# Patient Record
Sex: MALE | Race: WHITE | NOT HISPANIC OR LATINO | Employment: FULL TIME | URBAN - METROPOLITAN AREA
[De-identification: names, ages, dates, MRNs, and addresses within clinical notes are randomized per-mention and may not be internally consistent; named-entity substitution may affect disease eponyms.]

---

## 2018-11-17 ENCOUNTER — ANESTHESIA EVENT (EMERGENCY)
Dept: PERIOP | Facility: HOSPITAL | Age: 33
End: 2018-11-17
Payer: COMMERCIAL

## 2018-11-17 ENCOUNTER — HOSPITAL ENCOUNTER (OUTPATIENT)
Facility: HOSPITAL | Age: 33
Setting detail: OBSERVATION
Discharge: HOME | End: 2018-11-18
Attending: EMERGENCY MEDICINE | Admitting: INTERNAL MEDICINE
Payer: COMMERCIAL

## 2018-11-17 ENCOUNTER — ANESTHESIA (EMERGENCY)
Dept: PERIOP | Facility: HOSPITAL | Age: 33
End: 2018-11-17
Payer: COMMERCIAL

## 2018-11-17 DIAGNOSIS — R58 BLEEDING: ICD-10-CM

## 2018-11-17 DIAGNOSIS — Z48.814: Primary | ICD-10-CM

## 2018-11-17 LAB
ANION GAP SERPL CALCULATED.3IONS-SCNC: 11 MMOL/L (ref 4–13)
BASOPHILS # BLD AUTO: 0.1 THOUSANDS/ΜL (ref 0–0.1)
BASOPHILS NFR BLD AUTO: 1 % (ref 0–1)
BUN SERPL-MCNC: 21 MG/DL (ref 5–25)
CALCIUM SERPL-MCNC: 8.9 MG/DL (ref 8.3–10.1)
CHLORIDE SERPL-SCNC: 99 MMOL/L (ref 100–108)
CO2 SERPL-SCNC: 27 MMOL/L (ref 21–32)
CREAT SERPL-MCNC: 0.97 MG/DL (ref 0.6–1.3)
EOSINOPHIL # BLD AUTO: 0.31 THOUSAND/ΜL (ref 0–0.61)
EOSINOPHIL NFR BLD AUTO: 3 % (ref 0–6)
ERYTHROCYTE [DISTWIDTH] IN BLOOD BY AUTOMATED COUNT: 12.5 % (ref 11.6–15.1)
GFR SERPL CREATININE-BSD FRML MDRD: 103 ML/MIN/1.73SQ M
GLUCOSE SERPL-MCNC: 101 MG/DL (ref 65–140)
HCT VFR BLD AUTO: 41.6 % (ref 36.5–49.3)
HGB BLD-MCNC: 13.6 G/DL (ref 12–17)
IMM GRANULOCYTES # BLD AUTO: 0.05 THOUSAND/UL (ref 0–0.2)
IMM GRANULOCYTES NFR BLD AUTO: 0 % (ref 0–2)
LYMPHOCYTES # BLD AUTO: 2.84 THOUSANDS/ΜL (ref 0.6–4.47)
LYMPHOCYTES NFR BLD AUTO: 23 % (ref 14–44)
MCH RBC QN AUTO: 29.6 PG (ref 26.8–34.3)
MCHC RBC AUTO-ENTMCNC: 32.7 G/DL (ref 31.4–37.4)
MCV RBC AUTO: 90 FL (ref 82–98)
MONOCYTES # BLD AUTO: 1.03 THOUSAND/ΜL (ref 0.17–1.22)
MONOCYTES NFR BLD AUTO: 8 % (ref 4–12)
NEUTROPHILS # BLD AUTO: 8.03 THOUSANDS/ΜL (ref 1.85–7.62)
NEUTS SEG NFR BLD AUTO: 65 % (ref 43–75)
NRBC BLD AUTO-RTO: 0 /100 WBCS
PLATELET # BLD AUTO: 225 THOUSANDS/UL (ref 149–390)
PMV BLD AUTO: 10.6 FL (ref 8.9–12.7)
POTASSIUM SERPL-SCNC: 3.8 MMOL/L (ref 3.5–5.3)
RBC # BLD AUTO: 4.6 MILLION/UL (ref 3.88–5.62)
SODIUM SERPL-SCNC: 137 MMOL/L (ref 136–145)
WBC # BLD AUTO: 12.36 THOUSAND/UL (ref 4.31–10.16)

## 2018-11-17 PROCEDURE — 99284 EMERGENCY DEPT VISIT MOD MDM: CPT

## 2018-11-17 PROCEDURE — 80048 BASIC METABOLIC PNL TOTAL CA: CPT | Performed by: PHYSICIAN ASSISTANT

## 2018-11-17 PROCEDURE — 96361 HYDRATE IV INFUSION ADD-ON: CPT

## 2018-11-17 PROCEDURE — 36415 COLL VENOUS BLD VENIPUNCTURE: CPT | Performed by: PHYSICIAN ASSISTANT

## 2018-11-17 PROCEDURE — 85025 COMPLETE CBC W/AUTO DIFF WBC: CPT | Performed by: PHYSICIAN ASSISTANT

## 2018-11-17 PROCEDURE — 96376 TX/PRO/DX INJ SAME DRUG ADON: CPT

## 2018-11-17 PROCEDURE — 90686 IIV4 VACC NO PRSV 0.5 ML IM: CPT | Performed by: INTERNAL MEDICINE

## 2018-11-17 PROCEDURE — 99219 PR INITIAL OBSERVATION CARE/DAY 50 MINUTES: CPT | Performed by: INTERNAL MEDICINE

## 2018-11-17 PROCEDURE — 96374 THER/PROPH/DIAG INJ IV PUSH: CPT

## 2018-11-17 RX ORDER — MORPHINE SULFATE 4 MG/ML
4 INJECTION, SOLUTION INTRAMUSCULAR; INTRAVENOUS ONCE
Status: COMPLETED | OUTPATIENT
Start: 2018-11-17 | End: 2018-11-17

## 2018-11-17 RX ORDER — CLINDAMYCIN PHOSPHATE 150 MG/ML
INJECTION, SOLUTION INTRAVENOUS AS NEEDED
Status: DISCONTINUED | OUTPATIENT
Start: 2018-11-17 | End: 2018-11-17 | Stop reason: SURG

## 2018-11-17 RX ORDER — SUCCINYLCHOLINE/SOD CL,ISO/PF 100 MG/5ML
SYRINGE (ML) INTRAVENOUS AS NEEDED
Status: DISCONTINUED | OUTPATIENT
Start: 2018-11-17 | End: 2018-11-17 | Stop reason: SURG

## 2018-11-17 RX ORDER — LIDOCAINE HYDROCHLORIDE AND EPINEPHRINE 10; 10 MG/ML; UG/ML
20 INJECTION, SOLUTION INFILTRATION; PERINEURAL ONCE
Status: COMPLETED | OUTPATIENT
Start: 2018-11-17 | End: 2018-11-17

## 2018-11-17 RX ORDER — OXYCODONE HYDROCHLORIDE AND ACETAMINOPHEN 5; 325 MG/1; MG/1
1 TABLET ORAL EVERY 4 HOURS PRN
Status: DISCONTINUED | OUTPATIENT
Start: 2018-11-17 | End: 2018-11-18 | Stop reason: HOSPADM

## 2018-11-17 RX ORDER — FENTANYL CITRATE 50 UG/ML
INJECTION, SOLUTION INTRAMUSCULAR; INTRAVENOUS AS NEEDED
Status: DISCONTINUED | OUTPATIENT
Start: 2018-11-17 | End: 2018-11-17 | Stop reason: SURG

## 2018-11-17 RX ORDER — PROPOFOL 10 MG/ML
INJECTION, EMULSION INTRAVENOUS AS NEEDED
Status: DISCONTINUED | OUTPATIENT
Start: 2018-11-17 | End: 2018-11-17 | Stop reason: SURG

## 2018-11-17 RX ORDER — FENTANYL CITRATE/PF 50 MCG/ML
50 SYRINGE (ML) INJECTION
Status: COMPLETED | OUTPATIENT
Start: 2018-11-17 | End: 2018-11-17

## 2018-11-17 RX ORDER — TRANEXAMIC ACID 100 MG/ML
500 INJECTION, SOLUTION INTRAVENOUS ONCE
Status: DISCONTINUED | OUTPATIENT
Start: 2018-11-17 | End: 2018-11-18 | Stop reason: HOSPADM

## 2018-11-17 RX ORDER — SODIUM CHLORIDE AND POTASSIUM CHLORIDE .9; .15 G/100ML; G/100ML
125 SOLUTION INTRAVENOUS CONTINUOUS
Status: DISCONTINUED | OUTPATIENT
Start: 2018-11-17 | End: 2018-11-18 | Stop reason: HOSPADM

## 2018-11-17 RX ORDER — DEXAMETHASONE SODIUM PHOSPHATE 4 MG/ML
INJECTION, SOLUTION INTRA-ARTICULAR; INTRALESIONAL; INTRAMUSCULAR; INTRAVENOUS; SOFT TISSUE AS NEEDED
Status: DISCONTINUED | OUTPATIENT
Start: 2018-11-17 | End: 2018-11-17 | Stop reason: SURG

## 2018-11-17 RX ORDER — ONDANSETRON 2 MG/ML
INJECTION INTRAMUSCULAR; INTRAVENOUS AS NEEDED
Status: DISCONTINUED | OUTPATIENT
Start: 2018-11-17 | End: 2018-11-17 | Stop reason: SURG

## 2018-11-17 RX ORDER — SODIUM CHLORIDE 9 MG/ML
INJECTION, SOLUTION INTRAVENOUS CONTINUOUS PRN
Status: DISCONTINUED | OUTPATIENT
Start: 2018-11-17 | End: 2018-11-17 | Stop reason: SURG

## 2018-11-17 RX ORDER — SODIUM CHLORIDE 9 MG/ML
125 INJECTION, SOLUTION INTRAVENOUS CONTINUOUS
Status: CANCELLED | OUTPATIENT
Start: 2018-11-17

## 2018-11-17 RX ORDER — ONDANSETRON 2 MG/ML
4 INJECTION INTRAMUSCULAR; INTRAVENOUS ONCE AS NEEDED
Status: DISCONTINUED | OUTPATIENT
Start: 2018-11-17 | End: 2018-11-17 | Stop reason: HOSPADM

## 2018-11-17 RX ORDER — LIDOCAINE HYDROCHLORIDE 10 MG/ML
INJECTION, SOLUTION INFILTRATION; PERINEURAL AS NEEDED
Status: DISCONTINUED | OUTPATIENT
Start: 2018-11-17 | End: 2018-11-17 | Stop reason: SURG

## 2018-11-17 RX ADMIN — SODIUM CHLORIDE AND POTASSIUM CHLORIDE 125 ML/HR: .9; .15 SOLUTION INTRAVENOUS at 21:29

## 2018-11-17 RX ADMIN — FENTANYL CITRATE 50 MCG: 50 INJECTION, SOLUTION INTRAMUSCULAR; INTRAVENOUS at 19:05

## 2018-11-17 RX ADMIN — LIDOCAINE HYDROCHLORIDE 50 MG: 10 INJECTION, SOLUTION INFILTRATION; PERINEURAL at 17:36

## 2018-11-17 RX ADMIN — SODIUM CHLORIDE: 0.9 INJECTION, SOLUTION INTRAVENOUS at 17:31

## 2018-11-17 RX ADMIN — CLINDAMYCIN PHOSPHATE 600 MG: 150 INJECTION, SOLUTION INTRAMUSCULAR; INTRAVENOUS at 17:44

## 2018-11-17 RX ADMIN — FENTANYL CITRATE 50 MCG: 50 INJECTION, SOLUTION INTRAMUSCULAR; INTRAVENOUS at 19:16

## 2018-11-17 RX ADMIN — FENTANYL CITRATE 50 MCG: 50 INJECTION, SOLUTION INTRAMUSCULAR; INTRAVENOUS at 18:54

## 2018-11-17 RX ADMIN — FENTANYL CITRATE 50 MCG: 50 INJECTION, SOLUTION INTRAMUSCULAR; INTRAVENOUS at 19:35

## 2018-11-17 RX ADMIN — MORPHINE SULFATE 4 MG: 4 INJECTION INTRAVENOUS at 13:10

## 2018-11-17 RX ADMIN — ONDANSETRON HYDROCHLORIDE 4 MG: 2 INJECTION, SOLUTION INTRAVENOUS at 17:41

## 2018-11-17 RX ADMIN — OXYCODONE HYDROCHLORIDE AND ACETAMINOPHEN 1 TABLET: 5; 325 TABLET ORAL at 23:37

## 2018-11-17 RX ADMIN — FENTANYL CITRATE 25 MCG: 50 INJECTION, SOLUTION INTRAMUSCULAR; INTRAVENOUS at 17:57

## 2018-11-17 RX ADMIN — MORPHINE SULFATE 4 MG: 4 INJECTION INTRAVENOUS at 15:21

## 2018-11-17 RX ADMIN — DEXAMETHASONE SODIUM PHOSPHATE 4 MG: 4 INJECTION, SOLUTION INTRAMUSCULAR; INTRAVENOUS at 17:41

## 2018-11-17 RX ADMIN — LIDOCAINE HYDROCHLORIDE,EPINEPHRINE BITARTRATE 20 ML: 10; .01 INJECTION, SOLUTION INFILTRATION; PERINEURAL at 13:09

## 2018-11-17 RX ADMIN — PROPOFOL 300 MG: 10 INJECTION, EMULSION INTRAVENOUS at 17:36

## 2018-11-17 RX ADMIN — FENTANYL CITRATE 25 MCG: 50 INJECTION, SOLUTION INTRAMUSCULAR; INTRAVENOUS at 18:15

## 2018-11-17 RX ADMIN — SODIUM CHLORIDE 1000 ML: 0.9 INJECTION, SOLUTION INTRAVENOUS at 13:09

## 2018-11-17 RX ADMIN — Medication 160 MG: at 17:36

## 2018-11-17 RX ADMIN — SODIUM CHLORIDE: 0.9 INJECTION, SOLUTION INTRAVENOUS at 18:23

## 2018-11-17 RX ADMIN — FENTANYL CITRATE 25 MCG: 50 INJECTION, SOLUTION INTRAMUSCULAR; INTRAVENOUS at 18:04

## 2018-11-17 RX ADMIN — FENTANYL CITRATE 25 MCG: 50 INJECTION, SOLUTION INTRAMUSCULAR; INTRAVENOUS at 18:22

## 2018-11-17 RX ADMIN — INFLUENZA VIRUS VACCINE 0.5 ML: 15; 15; 15; 15 SUSPENSION INTRAMUSCULAR at 21:29

## 2018-11-17 NOTE — ED ATTENDING ATTESTATION
Glenis Gibbons MD, saw and evaluated the patient  I have discussed the patient with the resident/non-physician practitioner and agree with the resident's/non-physician practitioner's findings, Plan of Care, and MDM as documented in the resident's/non-physician practitioner's note, except where noted  All available labs and Radiology studies were reviewed  At this point I agree with the current assessment done in the Emergency Department  I have conducted an independent evaluation of this patient a history and physical is as follows: At bedside, myself, with nurse assistance, I set up suction, gown, and spent 30 mins attempting to flush the area, remove base clot, pack with surgicell, with no improvement, bleeding continued despite continuous pressure and surgicell packing  Temporary hemostasis can be achieved with gauze bite, but needs to be frequently changed  I feel dedicated procedure is necessary  I have call to [facility] for recommendations  14:45  D/W Dr Reina Coker who will see patient in ED    16:30  Dr Reina Coker cannot complete procedure in ED, needs the OR  Admit will be to [facility] because they are not an admitting service          Critical Care Time  CritCare Time    Procedures

## 2018-11-17 NOTE — ED NOTES
Bleeding check by me, appears to have seeping bleeding from right lower gum, [facility] aware        Gilda Damian, INDIO  11/17/18 7605

## 2018-11-17 NOTE — CONSULTS
Consultation -  Oral and Maxillofacial Surgery  Charmaine Hopkins 29 y o  male MRN: [id number]  Unit/Bed#: ED 14 Encounter: [id number]      Assessment/Plan     Assessment:  36-year-old male status post extraction of multiple teeth with continuous bleeding from the extraction site number 32  Patient in severe pain and unable to control the bleeding in the emergency department  Will plan for emergent debridement, exploration and control of bleeding in the operating room with general anesthesia  An attempt was made to examine the area and possibly control the bleeding emergency department  The patient was given 4 cc of 2% lidocaine with epinephrine but the patient was in pain upon injection and continued to be in pain in spite of the administration of local anesthesia  The area could not be manipulated or explored  Plan: Will plan for emergent debridement, exploration and control of bleeding in the operating room with general anesthesia  History of Present Illness   Physician Requesting Consult: Orville Frazier MD  Reason for Consult / Principal Problem:  Persistent bleeding from dental extraction site  HPI: Charmaine Hopkins is a 29y o  year old male who presents with uncontrolled bleeding since yesterday from the extraction site from mandibular right posterior tooth  Patient states that he had 3 teeth extracted by an oral surgeon at [facility]  He states that he has had persistent bleeding from the mandibular right posterior site since yesterday  He states that it has been constant  He states that his pain is 3/10 when the site is undisturbed but states that it is a 8/10 when the area is examined in spite of multiple doses of local anesthesia  Patient states the bleeding has been constant  An attempt was made by the emergency department team to place Gel-Foam and other topical agents, but this was unsuccessful  The patient is very agitated, and just wants this to end"      Inpatient consult to Oral and Maxillofacial Surgery  Consult performed by: Donovan Landeros, [facility] ordered by: Cuba Escalona          Review of Systems   Constitutional: Negative for activity change, appetite change, fatigue and fever  HENT: Positive for dental problem  Negative for ear pain, hearing loss, sinus pain, sore throat and trouble swallowing  Eyes: Negative for photophobia, pain, redness and visual disturbance  Respiratory: Negative for cough, chest tightness, shortness of breath and wheezing  Cardiovascular: Negative for chest pain and palpitations  Gastrointestinal: Negative for abdominal distention, abdominal pain, constipation and diarrhea  Endocrine: Negative  Genitourinary: Negative  Musculoskeletal: Negative for arthralgias, back pain, joint swelling and neck stiffness  Skin: Negative for color change and rash  Neurological: Negative for dizziness, seizures and numbness  Hematological: Does not bruise/bleed easily  Psychiatric/Behavioral: Positive for agitation  Negative for behavioral problems and confusion  Historical Information   History reviewed  No pertinent past medical history  History reviewed  No pertinent surgical history  Social History   History   Alcohol Use No     History   Drug Use No     History   Smoking Status    Never Smoker   Smokeless Tobacco    Never Used     Family History: History reviewed  No pertinent family history      Meds/Allergies   current meds:   Current Facility-Administered Medications   Medication Dose Route Frequency    tranexamic acid 100mg/mL (for epistaxis) 500 mg  500 mg Nasal Once       No Known Allergies    Objective       Intake/Output Summary (Last 24 hours) at 11/17/18 1640  Last data filed at 11/17/18 1445   Gross per 24 hour   Intake             1000 ml   Output                0 ml   Net             1000 ml       Invasive Devices          No matching active lines, drains, or airways          Physical Exam   Constitutional: He is oriented to person, place, and time  He appears well-developed and well-nourished  HENT:   Head: Normocephalic  Patient is status post extraction of multiple teeth  Continuous moderate bleeding from extraction site #31  Floor of mouth and pharynx are within normal limits  Occlusion is stable and reproducible  Eyes: Conjunctivae and EOM are normal    Neck: Normal range of motion  Neck supple  Cardiovascular: Normal heart sounds  Pulmonary/Chest: Breath sounds normal    Abdominal: Soft  Musculoskeletal: Normal range of motion  Neurological: He is alert and oriented to person, place, and time  Skin: Skin is warm and dry  Psychiatric: He has a normal mood and affect  Thought content normal        Lab Results:   CBC:   Lab Results   Component Value Date    WBC 12 36 (H) 11/17/2018    HGB 13 6 11/17/2018    HCT 41 6 11/17/2018    MCV 90 11/17/2018     11/17/2018    MCH 29 6 11/17/2018    MCHC 32 7 11/17/2018    RDW 12 5 11/17/2018    MPV 10 6 11/17/2018    NRBC 0 11/17/2018     Imaging Studies: There are no dental radiographs available as there is no ability to take them in this setting  EKG, Pathology, and Other Studies: none needed    Code Status: No Order  Advance Directive and Living Will:      Power of :    POLST:      Counseling/Coordination of Care: Total floor / unit time spent today 30 minutes  Greater than 50% of total time was spent with the patient and / or family counseling and / or coordination of care  A description of the counseling / coordination of care: I examined the patient, administered local anesthesia, discussed the option of performing the procedure under general anesthesia in the operating room, answered all questions, and obtained informed consent  I discussed the case with the anesthesia and nursing team and the attending physician in the emergency department

## 2018-11-17 NOTE — ANESTHESIA PREPROCEDURE EVALUATION
Review of Systems/Medical History  Patient summary reviewed  Chart reviewed      Cardiovascular  Negative cardio ROS    Pulmonary  Negative pulmonary ROS        GI/Hepatic  Negative GI/hepatic ROS          Negative  ROS        Endo/Other  Negative endo/other ROS      GYN  Negative gynecology ROS          Hematology  Negative hematology ROS      Musculoskeletal  Negative musculoskeletal ROS        Neurology  Negative neurology ROS      Psychology   Negative psychology ROS              Physical Exam    Airway  Comment: Blood in airway  Mallampati score: II  TM Distance: >3 FB  Neck ROM: full     Dental   No notable dental hx     Cardiovascular  Comment: Negative ROS, Rhythm: regular, Rate: normal, Cardiovascular exam normal    Pulmonary  Pulmonary exam normal Breath sounds clear to auscultation,     Other Findings        Anesthesia Plan  ASA Score- 1 Emergent    Anesthesia Type- general with ASA Monitors  Additional Monitors:   Airway Plan: ETT  Plan Factors-    Induction- intravenous and rapid sequence induction  Postoperative Plan-     Informed Consent- Anesthetic plan and risks discussed with patient

## 2018-11-17 NOTE — ED NOTES
Continues with bleeding after tranexamic acid applied by Iris Lisa [facility] aware pt rechecked by Darleen Harris and Yu Cerda RN  11/17/18 7548

## 2018-11-17 NOTE — LETTER
[address] [address]ley [address] [place] [id number]  Dept: [phone]    November 18, 2018     Patient: Saleem Kendall   YOB: 1985   Date of Visit: 11/17/2018       To Whom it May Concern:    Saleem Kendall is under my professional care  He was seen in the hospital from 11/17/2018   to 11/18/18  He may return to work on 11/20/18 with out restrictions  If you have any questions or concerns, please don't hesitate to call           Sincerely,          Roxana Marina, DO

## 2018-11-17 NOTE — ED PROVIDER NOTES
History  Chief Complaint   Patient presents with    Bleeding/Bruising     Pt  had 3 teeth pulled yesterday and reports the bleeding from one of the sides has not stopped since which is concerning him  Reports slight pain at this time  Took percocet last at 1 am  Has packing in place  28 yo male presents to the ER with oral bleeding from the R lower gum that started yesterday  Pt had 3 molars removed yesterday by [facility] surgery  Pt states that the other 2 locations where he had molars extracted have not been bleeding but the area where his molar was extracted on the R lower gum and he states that he was bleeding at the time he was discharged from the procedure yesterday and was sent home with gauze and supplies  Pt states that he went through all the gauze and his wife bought more  Pt called the oral surgeon and was advised to continue using pressure and to use tea bags over the area  Pt states that he tried both and was unsuccessful at reducing the bleeding  Pt states that he has not been able to eat since Thursday and has had minimal fluid intake due to mouth issues  Pt took 1 percocet yesterday  Pt denies any blood thinners  None       History reviewed  No pertinent past medical history  History reviewed  No pertinent surgical history  History reviewed  No pertinent family history  I have reviewed and agree with the history as documented  Social History   Substance Use Topics    Smoking status: Never Smoker    Smokeless tobacco: Never Used    Alcohol use No        Review of Systems   Constitutional: Negative for chills and fever  HENT: Positive for dental problem  Negative for facial swelling  Respiratory: Negative for chest tightness and shortness of breath  Cardiovascular: Negative for chest pain  Gastrointestinal: Negative for nausea and vomiting  Skin: Positive for wound         Physical Exam  Physical Exam   Constitutional: He is oriented to person, place, and time  He appears well-developed and well-nourished  He appears distressed  HENT:   Head: Normocephalic and atraumatic  Mouth/Throat: Uvula is midline and oropharynx is clear and moist  No trismus in the jaw  Abnormal dentition  Bleeding noted from gum and socket on R lower gum  Large clot noted over the area and area is tender to palpation  Gelfoam placed over area with pressure and decrease in bleeding noted but area is still oozing  TXA on gauze placed and pt advised to apply pressure to area  Bleeding improved while gauze was in place but resumed after gauze was removed to reassess  Bleeding is controlled with pressure temporarily but worsens once pressure is removed  Eyes: Conjunctivae are normal    Cardiovascular: Normal rate, regular rhythm, normal heart sounds and intact distal pulses  Pulmonary/Chest: Effort normal and breath sounds normal    Neurological: He is alert and oriented to person, place, and time  Skin: Skin is warm, dry and intact  Capillary refill takes less than 2 seconds  He is not diaphoretic  Nursing note and vitals reviewed        Vital Signs  ED Triage Vitals   Temperature Pulse Respirations Blood Pressure SpO2   11/17/18 0753 11/17/18 0753 11/17/18 0753 11/17/18 0753 11/17/18 0753   97 6 °F (36 4 °C) 103 18 163/99 97 %      Temp Source Heart Rate Source Patient Position - Orthostatic VS BP Location FiO2 (%)   11/17/18 0753 11/17/18 1024 11/17/18 1313 11/17/18 1024 --   Temporal Monitor Lying Right arm       Pain Score       11/17/18 0753       5           Vitals:    11/17/18 0753 11/17/18 1024 11/17/18 1313 11/17/18 1521   BP: 163/99 142/64 149/90 135/71   Pulse: 103 78 99 80   Patient Position - Orthostatic VS:   Lying Lying       Visual Acuity      ED Medications  Medications   tranexamic acid 100mg/mL (for epistaxis) 500 mg ( Nasal MAR Hold 11/17/18 1716)   sodium chloride 0 9 % with KCl 20 mEq/L infusion (premix) (not administered)   morphine (PF) 4 mg/mL injection 4 mg (4 mg Intravenous Given 11/17/18 1310)   sodium chloride 0 9 % bolus 1,000 mL (0 mL Intravenous Stopped 11/17/18 1445)   lidocaine-epinephrine (XYLOCAINE/EPINEPHRINE) 1 %-1:100,000 injection 20 mL (20 mL Infiltration Given 11/17/18 1309)   morphine (PF) 4 mg/mL injection 4 mg (4 mg Intravenous Given 11/17/18 1521)       Diagnostic Studies  Results Reviewed     Procedure Component Value Units Date/Time    Basic metabolic panel [[id number]]  (Abnormal) Collected:  11/17/18 1307    Lab Status:  Final result Specimen:  Blood from Arm, Left Updated:  11/17/18 1400     Sodium 137 mmol/L      Potassium 3 8 mmol/L      Chloride 99 (L) mmol/L      CO2 27 mmol/L      ANION GAP 11 mmol/L      BUN 21 mg/dL      Creatinine 0 97 mg/dL      Glucose 101 mg/dL      Calcium 8 9 mg/dL      eGFR 103 ml/min/1 73sq m     Narrative:         National Kidney Disease Education Program recommendations are as follows:  GFR calculation is accurate only with a steady state creatinine  Chronic Kidney disease less than 60 ml/min/1 73 sq  meters  Kidney failure less than 15 ml/min/1 73 sq  meters      CBC and differential [[id number]]  (Abnormal) Collected:  11/17/18 1307    Lab Status:  Final result Specimen:  Blood from Arm, Left Updated:  11/17/18 1345     WBC 12 36 (H) Thousand/uL      RBC 4 60 Million/uL      Hemoglobin 13 6 g/dL      Hematocrit 41 6 %      MCV 90 fL      MCH 29 6 pg      MCHC 32 7 g/dL      RDW 12 5 %      MPV 10 6 fL      Platelets 277 Thousands/uL      nRBC 0 /100 WBCs      Neutrophils Relative 65 %      Immat GRANS % 0 %      Lymphocytes Relative 23 %      Monocytes Relative 8 %      Eosinophils Relative 3 %      Basophils Relative 1 %      Neutrophils Absolute 8 03 (H) Thousands/µL      Immature Grans Absolute 0 05 Thousand/uL      Lymphocytes Absolute 2 84 Thousands/µL      Monocytes Absolute 1 03 Thousand/µL      Eosinophils Absolute 0 31 Thousand/µL      Basophils Absolute 0 10 Thousands/µL No orders to display              Procedures  Procedures       Phone Contacts  ED Phone Contact    ED Course  ED Course as of Nov 17 1721   Sat Nov 17, 2018   0940 Pt notes decrease in bleeding in mouth after gelfoam placed  Will continue to apply pressure and if bleeding has stopped can be discharged    1015 Pt still has some mild bleeding to area  2nd gelfoam was added to area with pressure and will re-evaluate in 10 minutes    1451 Oral surgery is coming to see the patient  He is requesting a little more pain medication  MDM  Number of Diagnoses or Management Options  Encounter for surgical aftercare following surgery on the teeth or oral cavity:   Diagnosis management comments: Case discussed with Dr Gm Galdamez and he examined pt and attempted to remove the clot and control bleeding  Pt received IV fluids and morphine for pain  Dr Jessee Stewart from oral surgery was consulted and came to the ER to examine pt and will be taking the pt to the OR  Pt will then be admitted under Dr Charbel Zazueta service post op  Amount and/or Complexity of Data Reviewed  Clinical lab tests: ordered and reviewed    Patient Progress  Patient progress: stable    CritCare Time    Disposition  Final diagnoses:   Encounter for surgical aftercare following surgery on the teeth or oral cavity     Time reflects when diagnosis was documented in both MDM as applicable and the Disposition within this note     Time User Action Codes Description Comment    11/17/2018  3:23 PM Bola Butcher Add [M30 390] Encounter for surgical aftercare following surgery on the teeth or oral cavity     11/17/2018  4:28 PM Awais Reina Add [R58] Bleeding       ED Disposition     ED Disposition Condition Comment    Admit  Case was discussed with Charbel Hurley and the patient's admission status was agreed to be Admission Status: observation status to the service of Dr Charbel Zazueta           Follow-up Information    None There are no discharge medications for this patient  No discharge procedures on file      ED Provider  Electronically Signed by           Natalie Pabon PA-C  11/17/18 8195

## 2018-11-17 NOTE — OP NOTE
OPERATIVE REPORT  PATIENT NAME: Reba Osullivan    :  1985  MRN: [id number]  Pt Location: [facility] OR ROOM 01    SURGERY DATE: 2018    Surgeon(s) and Role:     * Shoshana oJhnson DMD - Primary    Preop Diagnosis:  Bleeding [R58]    Post-Op Diagnosis Codes: * Bleeding [R58]    Procedure(s) (LRB):  DEBRIDEMENT FACIAL/HEAD [facility]) control of bleeding extraction site #31 (Right)  Emergent control bleeding  Specimen(s):  * No specimens in log *    Estimated Blood Loss:   25 mL    Drains: none       Anesthesia Type:   General    Operative Indications:  Bleeding [R58]  Patient is status post extraction of teeth # 2, 18, in 32 yesterday  He had continues bleeding overnight in into today from the extraction site at tooth 31  Patient was examined in the emergency department at [facility] but hemostasis was not able to be achieved in that setting  The patient was taken emergently to the operating room for control of bleeding  Operative Findings:  Multiple areas of continuous bleeding from the apical area of the socket at tooth 31  Complications:   None    Procedure and Technique:  The patient was identified in the preoperative holding area  The procedure was reviewed with the patient and informed consent was obtained a 2nd time as the 1st consent form was misplaced  I answered all the patient's questions  The patient is taken to the operating room was placed supine on the operating table  General anesthesia was induced and the patient was intubated via oral endotracheal tube that was placed off to the left side  Examination revealed continuous bleeding from the site at tooth number 31  The extraction sites at teeth 2 and 18 were hemostatic   5 mL of lidocaine 1% +1 100,000 epinephrine was infiltrated and given as a right mandibular block  A pharyngeal packing was placed  The sutures were removed  The flap was reflected at the extraction site at tooth 31   Poorly formed clot was removed  The site was gently debrided  At the apex of the site there are multiple continuous bleeding areas  An attempt was made to obtain hemostasis with Surgicel and Gelfoam and pressure however very continued to bleed multiple spots  These areas required cautery  The site was thoroughly irrigated  There was mild oozing  This was controlled with Surgicel and Gelfoam packing and pressure for 10 minutes  The area became hemostatic  The gingival tissues was sutured primarily with multiple 3 0 chromic gut sutures  The pharyngeal packing was removed  The sponge needle and instrument count were consistent at the end of the procedure  Patient tolerated the procedure well  He was extubated in the operating room and transported to the recovery room without complication       I was present for the entire procedure    Patient Disposition:  PACU     SIGNATURE: Rom Grossman DMD  DATE: November 17, 2018  TIME: 6:28 PM

## 2018-11-17 NOTE — ED NOTES
Tranexamic acid applied by Yassine Serrano [facility]     Kamran Whiting RN  11/17/18 3071

## 2018-11-17 NOTE — H&P
Unit/Bed#: [facility] [place] Encounter: [id number]        History of Present Illness     HPI:  Shefali Bradley is a 29 y o  male who presents with bleeding from his tooth socket on the right side of his jaw  He recently underwent 3 dental extractions  He has had persistent bleeding from his 1 tooth socket  Despite extensive attempts to get this to stop it has not  He is therefore going to be taken to the operating room for control of bleeding  Except for being hungry, he is feeling well  He has been in good health  He has never had any chest pain, shortness of breath, palpitations, dizziness, cough, wheezing, sputum production, etc     The patient's past medical history is completely negative  Past surgical history includes repair of a inguinal hernia and wisdom teeth extraction  Neither of these procedures were complicated by excessive bleeding  The patient is on no chronic medications  The patient has no known allergies  Family history is negative for any bleeding conditions  Social history reveals that the patient smokes occasionally  He does not imbibe ethanol nor does he use illicit drugs       Review of Systems  A detailed 12 point review of systems was conducted and is negative apart from those mentioned in the HPI  Objective   Vitals: Blood pressure 135/71, pulse 80, temperature 97 6 °F (36 4 °C), temperature source Temporal, resp  rate 18, weight 97 5 kg (215 lb), SpO2 98 %  Physical Exam   The patient is a well-developed, well-nourished man who appears in no distress  Head is atraumatic and normocephalic  ENT examination reveals persistent bleeding from his right mandible  Eye examination reveals the pupils to be equal, round, and reactive to light  Extraocular movements are intact  Neck is supple  Carotids are full without bruits  There is no lymphadenopathy or goiter  Lungs are clear to auscultation and percussion  There is no wheezing, rales, or rhonchi    Cardiac exam reveals a regular rhythm  I heard no murmur, gallop, or rub  The abdomen is soft with active bowel sounds  There is no mass, tenderness, or organomegaly  Extremities show no clubbing, cyanosis, or edema  There is no calf tenderness  Peripheral pulses are intact  Neurologic examination reveals the patient to be alert or oriented  No focal or lateralizing sign is present  Lab Results:   Results for orders placed or performed during the hospital encounter of 11/17/18   CBC and differential   Result Value Ref Range    WBC 12 36 (H) 4 31 - 10 16 Thousand/uL    RBC 4 60 3 88 - 5 62 Million/uL    Hemoglobin 13 6 12 0 - 17 0 g/dL    Hematocrit 41 6 36 5 - 49 3 %    MCV 90 82 - 98 fL    MCH 29 6 26 8 - 34 3 pg    MCHC 32 7 31 4 - 37 4 g/dL    RDW 12 5 11 6 - 15 1 %    MPV 10 6 8 9 - 12 7 fL    Platelets 215 641 - 273 Thousands/uL    nRBC 0 /100 WBCs    Neutrophils Relative 65 43 - 75 %    Immat GRANS % 0 0 - 2 %    Lymphocytes Relative 23 14 - 44 %    Monocytes Relative 8 4 - 12 %    Eosinophils Relative 3 0 - 6 %    Basophils Relative 1 0 - 1 %    Neutrophils Absolute 8 03 (H) 1 85 - 7 62 Thousands/µL    Immature Grans Absolute 0 05 0 00 - 0 20 Thousand/uL    Lymphocytes Absolute 2 84 0 60 - 4 47 Thousands/µL    Monocytes Absolute 1 03 0 17 - 1 22 Thousand/µL    Eosinophils Absolute 0 31 0 00 - 0 61 Thousand/µL    Basophils Absolute 0 10 0 00 - 0 10 Thousands/µL   Basic metabolic panel   Result Value Ref Range    Sodium 137 136 - 145 mmol/L    Potassium 3 8 3 5 - 5 3 mmol/L    Chloride 99 (L) 100 - 108 mmol/L    CO2 27 21 - 32 mmol/L    ANION GAP 11 4 - 13 mmol/L    BUN 21 5 - 25 mg/dL    Creatinine 0 97 0 60 - 1 30 mg/dL    Glucose 101 65 - 140 mg/dL    Calcium 8 9 8 3 - 10 1 mg/dL    eGFR 103 ml/min/1 73sq m       Assessment/Plan     Assessment:  1  Persistent bleeding following dental extraction     Plan:  The patient has been seen by Oral surgery    Plans are in the works for him to go to the operating room for exploration of the area under general anesthesia and control of bleeding  The patient appears to be medically stable for this procedure  No additional medical preparation is needed  I discussed resuscitative measures with the patient he would like all available modalities employed on his behalf in the event of a cardiac or pulmonary arrest   He is therefore sign to code blue level 1  I suspect that the patient's hospitalization will only span 1 midnight and he is therefore admitted to observation status          Code Status: Level 1 - Full Code

## 2018-11-18 VITALS
BODY MASS INDEX: 31.47 KG/M2 | TEMPERATURE: 98.9 F | DIASTOLIC BLOOD PRESSURE: 76 MMHG | HEIGHT: 72 IN | RESPIRATION RATE: 16 BRPM | SYSTOLIC BLOOD PRESSURE: 121 MMHG | WEIGHT: 232.37 LBS | HEART RATE: 66 BPM | OXYGEN SATURATION: 98 %

## 2018-11-18 PROBLEM — R58 BLEEDING: Status: RESOLVED | Noted: 2018-11-17 | Resolved: 2018-11-18

## 2018-11-18 LAB
ANION GAP SERPL CALCULATED.3IONS-SCNC: 11 MMOL/L (ref 4–13)
BASOPHILS # BLD AUTO: 0.05 THOUSANDS/ΜL (ref 0–0.1)
BASOPHILS NFR BLD AUTO: 1 % (ref 0–1)
BUN SERPL-MCNC: 14 MG/DL (ref 5–25)
CALCIUM SERPL-MCNC: 8.2 MG/DL (ref 8.3–10.1)
CHLORIDE SERPL-SCNC: 105 MMOL/L (ref 100–108)
CO2 SERPL-SCNC: 24 MMOL/L (ref 21–32)
CREAT SERPL-MCNC: 0.84 MG/DL (ref 0.6–1.3)
EOSINOPHIL # BLD AUTO: 0.06 THOUSAND/ΜL (ref 0–0.61)
EOSINOPHIL NFR BLD AUTO: 1 % (ref 0–6)
ERYTHROCYTE [DISTWIDTH] IN BLOOD BY AUTOMATED COUNT: 12.5 % (ref 11.6–15.1)
GFR SERPL CREATININE-BSD FRML MDRD: 116 ML/MIN/1.73SQ M
GLUCOSE SERPL-MCNC: 99 MG/DL (ref 65–140)
HCT VFR BLD AUTO: 35 % (ref 36.5–49.3)
HGB BLD-MCNC: 11.6 G/DL (ref 12–17)
IMM GRANULOCYTES # BLD AUTO: 0.03 THOUSAND/UL (ref 0–0.2)
IMM GRANULOCYTES NFR BLD AUTO: 0 % (ref 0–2)
LYMPHOCYTES # BLD AUTO: 2.11 THOUSANDS/ΜL (ref 0.6–4.47)
LYMPHOCYTES NFR BLD AUTO: 22 % (ref 14–44)
MCH RBC QN AUTO: 30.3 PG (ref 26.8–34.3)
MCHC RBC AUTO-ENTMCNC: 33.1 G/DL (ref 31.4–37.4)
MCV RBC AUTO: 91 FL (ref 82–98)
MONOCYTES # BLD AUTO: 0.81 THOUSAND/ΜL (ref 0.17–1.22)
MONOCYTES NFR BLD AUTO: 9 % (ref 4–12)
NEUTROPHILS # BLD AUTO: 6.38 THOUSANDS/ΜL (ref 1.85–7.62)
NEUTS SEG NFR BLD AUTO: 67 % (ref 43–75)
NRBC BLD AUTO-RTO: 0 /100 WBCS
PLATELET # BLD AUTO: 194 THOUSANDS/UL (ref 149–390)
PMV BLD AUTO: 10.5 FL (ref 8.9–12.7)
POTASSIUM SERPL-SCNC: 4.2 MMOL/L (ref 3.5–5.3)
RBC # BLD AUTO: 3.83 MILLION/UL (ref 3.88–5.62)
SODIUM SERPL-SCNC: 140 MMOL/L (ref 136–145)
WBC # BLD AUTO: 9.44 THOUSAND/UL (ref 4.31–10.16)

## 2018-11-18 PROCEDURE — 85025 COMPLETE CBC W/AUTO DIFF WBC: CPT | Performed by: INTERNAL MEDICINE

## 2018-11-18 PROCEDURE — 80048 BASIC METABOLIC PNL TOTAL CA: CPT | Performed by: INTERNAL MEDICINE

## 2018-11-18 PROCEDURE — 99217 PR OBSERVATION CARE DISCHARGE MANAGEMENT: CPT | Performed by: INTERNAL MEDICINE

## 2018-11-18 RX ORDER — OXYCODONE HYDROCHLORIDE AND ACETAMINOPHEN 5; 325 MG/1; MG/1
1 TABLET ORAL EVERY 4 HOURS PRN
Qty: 10 TABLET | Refills: 0 | Status: SHIPPED | OUTPATIENT
Start: 2018-11-18 | End: 2018-11-28

## 2018-11-18 RX ADMIN — OXYCODONE HYDROCHLORIDE AND ACETAMINOPHEN 1 TABLET: 5; 325 TABLET ORAL at 08:41

## 2018-11-18 RX ADMIN — SODIUM CHLORIDE AND POTASSIUM CHLORIDE 125 ML/HR: .9; .15 SOLUTION INTRAVENOUS at 05:17

## 2018-11-18 NOTE — UTILIZATION REVIEW
[facility]  Utilization Review Department  Phone: [phone]; Fax [phone]  [email or web address]  ATTENTION: Please call with any questions or concerns to [phone]  and carefully listen to the prompts so that you are directed to the right person  Send all requests for admission clinical reviews, approved or denied determinations and any other requests to fax [phone]  All voicemails are confidential   Initial Clinical Review    Admission: Date/Time/Statement: OBS [email or web address] [facility]    Orders Placed This Encounter   Procedures    Place in Observation (expected length of stay for this patient is less than two midnights)     Standing Status:   Standing     Number of Occurrences:   1     Order Specific Question:   Admitting Physician     Answer:   RA CULP [[id number]]     Order Specific Question:   Level of Care     Answer:   Med Surg [16]         ED: Date/Time/Mode of Arrival:   ED Arrival Information     Expected Arrival Acuity Means of Arrival Escorted By Service Admission Type    - 11/17/2018 07:44 Urgent Walk-In Family Member General Medicine Urgent    Arrival Complaint    dental problem          Chief Complaint:   Chief Complaint   Patient presents with    Bleeding/Bruising     Pt  had 3 teeth pulled yesterday and reports the bleeding from one of the sides has not stopped since which is concerning him  Reports slight pain at this time  Took percocet last at 1 am  Has packing in place  History of Illness:  29 y o  male who presents with bleeding from his tooth socket on the right side of his jaw  He recently underwent 3 dental extractions  He has had persistent bleeding from his 1 tooth socket  Despite extensive attempts to get this to stop it has not  He is therefore going to be taken to the operating room for control of bleeding  Except for being hungry, he is feeling well      ED Vital Signs:   ED Triage Vitals   Temperature Pulse Respirations Blood Pressure SpO2   11/17/18 0753 11/17/18 0753 11/17/18 0753 11/17/18 0753 11/17/18 0753   97 6 °F (36 4 °C) 103 18 163/99 97 %      Temp Source Heart Rate Source Patient Position - Orthostatic VS BP Location FiO2 (%)   11/17/18 0753 11/17/18 1024 11/17/18 1313 11/17/18 1024 --   Temporal Monitor Lying Right arm       Pain Score       11/17/18 0753       5        Wt Readings from Last 1 Encounters:   11/17/18 105 kg (232 lb 5 8 oz)       Vital Signs (abnormal):   11/17/18 1915   97 3 °F (36 3 °C)  74  13  147/76  94 %  None (Room air)  --  --   11/17/18 1900  --  74  15  142/80  94 %  None (Room air)  --  --   11/17/18 1845  --  88  12  142/84  95 %  None (Room air)  --  --   11/17/18 1844  98 1 °F (36 7 °C)  96   11  145/81  95 %  None (Room air)  WDL  --   11/17/18 1521  --  80  18  135/71  98 %  None (Room air)  --  Lying   11/17/18 1313  --  99  16  149/90  99 %  None (Room air)  --  Lying   11/17/18 1024  --  78  18  142/64  98 %  --  --  --   11/17/18 0753  97 6 °F (36 4 °C)  103  18  163/99  97 %  None (Room air)  --  --         Abnormal Labs/Diagnostic Test Results:   Chlor 99  Wbc 12 36  hgb 11 6  hct 35 0    ED Treatment:   Medication Administration from 11/17/2018 0744 to 11/17/2018 1716       Date/Time Order Dose Route Action Action by Comments     11/17/2018 1716 tranexamic acid 100mg/mL (for epistaxis) 500 mg   Nasal MAR Hold Automatic Transfer Provider      11/17/2018 1310 morphine (PF) 4 mg/mL injection 4 mg 4 mg Intravenous Given Nathan Giordano RN      11/17/2018 1445 sodium chloride 0 9 % bolus 1,000 mL 0 mL Intravenous Stopped Nathan Giordano RN      11/17/2018 1309 sodium chloride 0 9 % bolus 1,000 mL 1,000 mL Intravenous New Bag Nathan Giordano RN      11/17/2018 1309 lidocaine-epinephrine (XYLOCAINE/EPINEPHRINE) 1 %-1:100,000 injection 20 mL 20 mL Infiltration Given Nathan Giordano RN given by [facility]     11/17/2018 1521 morphine (PF) 4 mg/mL injection 4 mg 4 mg Intravenous Given Capri Sloan RN           Past Medical/Surgical History: Active Ambulatory Problems     Diagnosis Date Noted    No Active Ambulatory Problems     Resolved Ambulatory Problems     Diagnosis Date Noted    No Resolved Ambulatory Problems     No Additional Past Medical History       Admitting Diagnosis: Bleeding [R58]  Pain, dental [K08 89]  Encounter for surgical aftercare following surgery on the teeth or oral cavity [Z48 814]    Age/Sex: 29 y o  male    Assessment/Plan: 28 yo male c/o persistent bleeding rom tooth socket on R side of jaw  Recent dental extrations x 3  OR- control bleeding oral surgery    I suspect that the patient's hospitalization will only span 1 midnight and he is therefore admitted to observation status  SURGERY DATE: 11/17/2018     Surgeon(s) and Role:     * Shoshana Johnson DMD - Primary     Preop Diagnosis:  Bleeding [R58]     Post-Op Diagnosis Codes: * Bleeding [R58]     Procedure(s) (LRB):  DEBRIDEMENT FACIAL/HEAD ([address] OUT) control of bleeding extraction site #31 (Right)  Emergent control bleeding      Specimen(s):  * No specimens in log *     Estimated Blood Loss:   25 mL     Drains: none        Anesthesia Type:   General     Operative Indications:  Bleeding [R58]  Patient is status post extraction of teeth # 2, 18, in 32 yesterday  He had continues bleeding overnight in into today from the extraction site at tooth 31  Patient was examined in the emergency department [facility] but hemostasis was not able to be achieved in that setting    The patient was taken emergently to the operating room for control of bleeding      Operative Findings:  Multiple areas of continuous bleeding from the apical area of the socket at tooth 31      Complications:   None    Admission Orders:  NPO  CONSULT ORAL/MAXILLOFACIAL SURGERY  OR- DEBRIDEMENT  OOB    Scheduled Meds:   Current Facility-Administered Medications:  oxyCODONE-acetaminophen 1 tablet Oral Q4H PRN Rom Grossman DMD    sodium chloride 0 9 % with KCl 20 mEq/L 125 mL/hr Intravenous Continuous Ilene Mayes MD Last Rate: 125 mL/hr (11/18/18 0517)   tranexamic acid 500 mg Nasal Once Ree Gannon PA-C      Continuous Infusions:   sodium chloride 0 9 % with KCl 20 mEq/L 125 mL/hr Last Rate: 125 mL/hr (11/18/18 0517)     PRN Meds:   oxyCODONE-acetaminophen PO X 2

## 2018-11-18 NOTE — PROGRESS NOTES
[facility] Progress Note  Patient: Peg Singh 29 y o  male   MRN: [id number]  PCP: No primary care provider on file  Unit/Bed#: [facility] 87 227-01 Encounter: [id number]  Date Of Visit: 11/18/18      Assessment/plan  1  Persistent bleeding post dental extraction- [facility] oral surgery recommendations  S/p or for debridement and washout to control bleeding  No further bleeding  Pt to follow up with [facility] surgery outpt  2  abla due to number 1- h/h is at 11 6  Bleeding has stopped no need for transfusion    dispo- d/c to home  Subjective:   Pt seen and examined  Pt doing well  Pt is only having some spotting at bleeding site  No longer soaking guaze  He still has pain at times  No f/c no cp no sob no n/v/d no abd pain  Objective:     Vitals: Blood pressure 121/76, pulse 66, temperature 98 9 °F (37 2 °C), temperature source Temporal, resp  rate 16, height 6' (1 829 m), weight 105 kg (232 lb 5 8 oz), SpO2 98 %  ,Body mass index is 31 51 kg/m²  Lab, Imaging and other studies:    Results from last 7 days  Lab Units 11/18/18  0512   WBC Thousand/uL 9 44   HEMOGLOBIN g/dL 11 6*   HEMATOCRIT % 35 0*   PLATELETS Thousands/uL 194       Results from last 7 days  Lab Units 11/18/18  0512   POTASSIUM mmol/L 4 2   CHLORIDE mmol/L 105   CO2 mmol/L 24   BUN mg/dL 14   CREATININE mg/dL 0 84   CALCIUM mg/dL 8 2*         No results found for: Geranup Hinojosa, WOUNDCULT, SPUTUMCULTUR      No results found      Scheduled Meds:   Current Facility-Administered Medications:  oxyCODONE-acetaminophen 1 tablet Oral Q4H PRN Awais Reina DMD    sodium chloride 0 9 % with KCl 20 mEq/L 125 mL/hr Intravenous Continuous Melly Her MD Last Rate: 125 mL/hr (11/18/18 0517)   tranexamic acid 500 mg Nasal Once Beba Diez PA-C      Continuous Infusions:   sodium chloride 0 9 % with KCl 20 mEq/L 125 mL/hr Last Rate: 125 mL/hr (11/18/18 0517)     PRN Meds:   oxyCODONE-acetaminophen      Physical exam:  Physical Exam  General appearance: alert and oriented, in no acute distress  Head: Normocephalic, without obvious abnormality, atraumatic  Eyes: conjunctivae/corneas clear  PERRL, EOM's intact  Fundi benign    Throat: minimal blood on guaze   Neck: no adenopathy, no carotid bruit, no JVD, supple, symmetrical, trachea midline and thyroid not enlarged, symmetric, no tenderness/mass/nodules  Lungs: clear to auscultation bilaterally  Heart: regular rate and rhythm, S1, S2 normal, no murmur, click, rub or gallop  Abdomen: soft, non-tender; bowel sounds normal; no masses,  no organomegaly  Extremities: extremities normal, warm and well-perfused; no cyanosis, clubbing, or edema  Pulses: 2+ and symmetric  Skin: Skin color, texture, turgor normal  No rashes or lesions  Neurologic: Grossly normal

## 2018-11-18 NOTE — ANESTHESIA POSTPROCEDURE EVALUATION
Post-Op Assessment Note      CV Status:  Stable    Mental Status:  Alert and awake    Hydration Status:  Euvolemic    PONV Controlled:  Controlled    Airway Patency:  Patent    Post Op Vitals Reviewed: Yes          Staff: Anesthesiologist           /75 (11/17/18 1930)    Temp     Pulse 61 (11/17/18 1930)   Resp (!) 11 (11/17/18 1930)    SpO2 93 % (11/17/18 1930)

## 2018-11-18 NOTE — PLAN OF CARE
Problem: PAIN - ADULT  Goal: Verbalizes/displays adequate comfort level or baseline comfort level  Interventions:  - Encourage patient to monitor pain and request assistance  - Assess pain using appropriate pain scale  - Administer analgesics based on type and severity of pain and evaluate response  - Implement non-pharmacological measures as appropriate and evaluate response  - Consider cultural and social influences on pain and pain management  - Notify physician/advanced practitioner if interventions unsuccessful or patient reports new pain  Outcome: Progressing      Problem: INFECTION - ADULT  Goal: Absence or prevention of progression during hospitalization  INTERVENTIONS:  - Assess and monitor for signs and symptoms of infection  - Monitor lab/diagnostic results  - Monitor all insertion sites, i e  indwelling lines, tubes, and drains  - Monitor endotracheal (as able) and nasal secretions for changes in amount and color  - Cisne appropriate cooling/warming therapies per order  - Administer medications as ordered  - Instruct and encourage patient and family to use good hand hygiene technique  - Identify and instruct in appropriate isolation precautions for identified infection/condition  Outcome: Progressing      Problem: DISCHARGE PLANNING  Goal: Discharge to home or other facility with appropriate resources  INTERVENTIONS:  - Identify barriers to discharge w/patient and caregiver  - Arrange for needed discharge resources and transportation as appropriate  - Identify discharge learning needs (meds, wound care, etc )  - Arrange for interpretive services to assist at discharge as needed  - Refer to Case Management Department for coordinating discharge planning if the patient needs post-hospital services based on physician/advanced practitioner order or complex needs related to functional status, cognitive ability, or social support system  Outcome: Progressing      Problem: GASTROINTESTINAL - ADULT  Goal: Minimal or absence of nausea and/or vomiting  INTERVENTIONS:  - Administer IV fluids as ordered to ensure adequate hydration  - Maintain NPO status until nausea and vomiting are resolved  - Nasogastric tube as ordered  - Administer ordered antiemetic medications as needed  - Provide nonpharmacologic comfort measures as appropriate  - Advance diet as tolerated, if ordered  - Nutrition services referral to assist patient with adequate nutrition and appropriate food choices  Outcome: Progressing      Problem: GENITOURINARY - ADULT  Goal: Absence of urinary retention  INTERVENTIONS:  - Assess patients ability to void and empty bladder  - Monitor I/O  - Bladder scan as needed  - Discuss with physician/AP medications to alleviate retention as needed  - Discuss catheterization for long term situations as appropriate  Outcome: Progressing      Problem: HEMATOLOGIC - ADULT  Goal: Maintains hematologic stability  INTERVENTIONS  - Assess for signs and symptoms of bleeding or hemorrhage  - Monitor labs  - Administer supportive blood products/factors as ordered and appropriate  Outcome: Progressing      Problem: SKIN/TISSUE INTEGRITY - ADULT  Goal: Incision(s), wounds(s) or drain site(s) healing without S/S of infection  INTERVENTIONS  - Assess and document risk factors for skin impairment   - Assess and document dressing, incision, wound bed, drain sites and surrounding tissue  - Initiate Nutrition services consult and/or wound management as needed  Outcome: Progressing    Goal: Oral mucous membranes remain intact  INTERVENTIONS  - Assess oral mucosa and hygiene practices  - Implement preventative oral hygiene regimen  - Implement oral medicated treatments as ordered  - Initiate Nutrition services referral as needed  Outcome: Progressing

## 2018-11-18 NOTE — DISCHARGE SUMMARY
Discharge Summary - [facility]    Patient Information: Edy Chawla 29 y o  male MRN: [id number]  Unit/Bed#: [facility] 87 227-01 Encounter: [id number]    Discharging Physician / Practitioner: Jasper Abreu DO  PCP: No primary care provider on file  Admission Date: 11/17/2018  Discharge Date: 11/18/18    Disposition:     Home     Reason for Admission: continuous bleeding at extraction site    Discharge Diagnoses:     Principal Problem (Resolved):    Bleeding  Active Problems:    * No active hospital problems  *      Consultations During Hospital Stay:  · Oral surgery     Procedures Performed:      s/p or for debridement and washout to control bleeding at extraction site number 31    Significant Findings / Test Results:   No results found  Incidental Findings:   · none    Test Results Pending at Discharge (will require follow up):   · none     Outpatient Tests Requested:  none    Hospital Course:     Edy Chawla is a 29 y o  male patient who originally presented to the hospital on 11/17/2018 due to persistent bleeding post dental extraction  He was evaluated by oral surgery  He is s/p or for debridement and washout to control bleeding at extraction site number 31  No further bleeding  Pt to follow up with [place] oral surgery outpt  He also had abla due to bleeding from extraction site h/h is at 11 6  Bleeding has stopped no need for transfusion    He was discharged to home    Discharge Day Visit / Exam:     * Please refer to separate progress note for these details *    Discharge instructions/Information to patient and family:   See after visit summary for information provided to patient and family  Provisions for Follow-Up Care:  See after visit summary for information related to follow-up care and any pertinent home health orders  Discharge Medications:  See after visit summary for reconciled discharge medications provided to patient and family

## 2021-04-12 DIAGNOSIS — Z23 ENCOUNTER FOR IMMUNIZATION: ICD-10-CM

## 2022-01-07 ENCOUNTER — IMMUNIZATIONS (OUTPATIENT)
Dept: FAMILY MEDICINE CLINIC | Facility: HOSPITAL | Age: 37
End: 2022-01-07

## 2022-01-07 DIAGNOSIS — Z23 ENCOUNTER FOR IMMUNIZATION: Primary | ICD-10-CM

## 2022-01-07 PROCEDURE — 91306 COVID-19 MODERNA VACC 0.25 ML BOOSTER: CPT

## 2022-01-07 PROCEDURE — 0064A COVID-19 MODERNA VACC 0.25 ML BOOSTER: CPT

## 2023-10-24 ENCOUNTER — APPOINTMENT (OUTPATIENT)
Dept: RADIOLOGY | Facility: MEDICAL CENTER | Age: 38
End: 2023-10-24
Payer: COMMERCIAL

## 2023-10-24 DIAGNOSIS — M25.572 LEFT ANKLE PAIN, UNSPECIFIED CHRONICITY: ICD-10-CM

## 2023-10-24 PROCEDURE — 73610 X-RAY EXAM OF ANKLE: CPT

## 2023-11-02 VITALS
BODY MASS INDEX: 31.51 KG/M2 | DIASTOLIC BLOOD PRESSURE: 86 MMHG | HEIGHT: 72 IN | HEART RATE: 92 BPM | SYSTOLIC BLOOD PRESSURE: 139 MMHG

## 2023-11-02 DIAGNOSIS — S93.492A SPRAIN OF ANTERIOR TALOFIBULAR LIGAMENT OF LEFT ANKLE, INITIAL ENCOUNTER: Primary | ICD-10-CM

## 2023-11-02 DIAGNOSIS — S82.62XA CLOSED AVULSION FRACTURE OF LATERAL MALLEOLUS OF LEFT FIBULA, INITIAL ENCOUNTER: ICD-10-CM

## 2023-11-02 PROCEDURE — 99203 OFFICE O/P NEW LOW 30 MIN: CPT | Performed by: EMERGENCY MEDICINE

## 2023-11-02 RX ORDER — NAPROXEN 500 MG/1
TABLET ORAL
COMMUNITY
Start: 2023-10-19

## 2023-11-02 NOTE — PATIENT INSTRUCTIONS
You may use Advil (ibuprofen) 400-600mg every 6 hours or at least twice per day OR Aleve (naproxen) 250-500mg every 12 hours as needed for pain and inflammation. You may also take Tylenol 500mg every 4-6 hours as needed OR max 1,000mg per dose up to 3 times per day for a total of 3,000mg per day  Check with your primary care physician to see if these medications are safe to take and to make sure they do not interfere with your other medications and medical issues. Ankle Sprain   AMBULATORY CARE:   An ankle sprain  happens when 1 or more ligaments in your ankle joint stretch or tear. Ligaments are tough tissues that connect bones. Ligaments support your joints and keep your bones in place. Common symptoms include the following:   Trouble moving your ankle or foot    Pain when you touch or put weight on your ankle    Bruised, swollen, or misshapen ankle  Seek care immediately if:   You have severe pain in your ankle. Your foot or toes are cold or numb. Your ankle becomes more weak or unstable (wobbly). You are unable to put any weight on your ankle or foot. Your swelling has increased or returned. Contact your healthcare provider if:   Your pain does not go away, even after treatment. You have questions or concerns about your condition or care. Treatment:   Medicines      NSAIDs , such as ibuprofen, help decrease swelling, pain, and fever. This medicine is available with or without a doctor's order. NSAIDs can cause stomach bleeding or kidney problems in certain people. If you take blood thinner medicine, always ask your healthcare provider if NSAIDs are safe for you. Always read the medicine label and follow directions. Acetaminophen  decreases pain. It is available without a doctor's order. Ask how much to take and how often to take it. Follow directions. Acetaminophen can cause liver damage if not taken correctly. Prescription pain medicine  may be given.  Ask how to take this medicine safely. Surgery  may be needed to repair or replace a torn ligament if your sprain does not heal with other treatments. Your healthcare provider may use screws to attach the bones in your ankle together. The screws may help support your ankle and make it stable. Ask your healthcare provider for more information about surgery to treat your ankle sprain. Self care:   Use support devices,  such as a brace, cast, or splint, may be needed to limit your movement and protect your joint. You may need to use crutches to decrease your pain as you move around. Go to physical therapy as directed. A physical therapist teaches you exercises to help improve movement and strength, and to decrease pain. Rest  your ankle so that it can heal. Return to normal activities as directed. Apply ice on your ankle for 15 to 20 minutes every hour or as directed. Use an ice pack, or put crushed ice in a plastic bag. Cover it with a towel. Ice helps prevent tissue damage and decreases swelling and pain. Compress  your ankle. Ask if you should wrap an elastic bandage around your injured ligament. An elastic bandage provides support and helps decrease swelling and movement so your joint can heal. Wear as long as directed. Elevate  your ankle above the level of your heart as often as you can. This will help decrease swelling and pain. Prop your ankle on pillows or blankets to keep it elevated comfortably. Prevent another ankle sprain:   Let your ankle heal.  Find out how long your ligament needs to heal. Do not do any physical activity until your healthcare provider says it is okay. If you start activity too soon, you may develop a more serious injury. Always warm up and stretch  before you exercise or play sports. Use the right equipment. Always wear shoes that fit well and are made for the activity that you are doing. You may also need ankle supports, elbow and knee pads, or braces.   Follow up with your healthcare provider as directed:  Write down your questions so you remember to ask them during your visits. © 2017 [facility] [place] Information is for End User's use only and may not be sold, redistributed or otherwise used for commercial purposes. All illustrations and images included in CareNotes® are the copyrighted property of [employer]. or [facility]. The above information is an  only. It is not intended as medical advice for individual conditions or treatments. Talk to your doctor, nurse or pharmacist before following any medical regimen to see if it is safe and effective for you. Ankle Exercises   AMBULATORY CARE:   What you need to know about ankle exercises: Ankle exercises help strengthen your ankle and improve its function after injury. These are beginning exercises. Ask your healthcare provider if you need to see a physical therapist for more advanced exercises. Do these exercises 3 to 5 days a week , or as directed by your healthcare provider. Ask if you should perform the exercises on each ankle. Do the exercises in the order that your healthcare provider recommends. This will help prevent swelling, chronic pain, and reinjury. Start with range of motion exercises. Then progress to strengthening exercises, and finally to balancing exercises. Warm up before you do ankle exercises. Walk or ride a stationary bike for 5 to 10 minutes to prepare your ankle for movement. Stop if you feel pain. It is normal to feel some discomfort at first. Regular exercise will help decrease your discomfort over time. How to perform range of motion exercises safely:  Begin with range of motion exercises to improve flexibility. Ask your healthcare provider when you can progress to strengthening exercises. Ankle alphabet:  Sit on a chair so that your feet do not touch the floor. Use your big toe to write each letter of the alphabet.  Use only your foot and ankle, and keep your movements small. Do 2 sets. Calf stretches:      Sitting calf stretches with a towel:  Sit on the floor with both legs out straight in front of you. Loop a towel around the ball of your injured foot. Grasp the ends of the towel and pull it toward you. Keep your leg and back straight. Do not lean forward as you pull the towel. Hold for 30 seconds. Then relax for 30 seconds. Do 2 sets of 10. Standing calf stretches:  Stand facing a wall with the foot that is not injured forward and your knee slightly bent. Keep the leg with the injured foot straight and behind you with your toes pointed in slightly. With both heels flat on the floor, press your hips forward. Do not arch your back. Hold for 30 seconds, and then relax for 30 seconds. Do 2 sets of 10. Repeat with your leg bent. Do 2 sets of 10. How to perform strengthening exercises safely:  After you can perform range of motion exercises without pain, you may begin strengthening exercises. Ask your healthcare provider when you can progress to balancing exercises. Ankle movement in 4 directions:  Sit on the floor with your legs straight in front of you. Keep your heels on the floor for support. Dorsiflexion:  Begin with your toes pointing straight up. Pull your toes toward your body. Slowly return to the starting position. Do 3 sets of 5. Plantar flexion:  Begin with your toes pointing straight up. Push your toes away from your body. Slowly return to the starting position. Do 3 sets of 5. Inversion:  Begin with your toes pointing straight up. Push your toes inward, toward each other. Slowly return to the starting position. Do 3 sets of 5. Eversion:  Begin with your toes pointing straight up. Push your toes outward, away from each other. Slowly return to the starting position. Do 3 sets of 5. Toe curls with a towel:  Sit on a chair so that both of your feet are flat on the floor.  Place a small towel on the floor in front of your injured foot. Grab the center of the towel with your toes and curl the towel toward you. Relax and repeat. Do 1 set of 5. [place] pick-ups:  Sit on a chair so that both of your feet are flat on the floor. Place 20 marbles on the floor in front of your injured foot. Use your toes to  one marble at a time and place it into a bowl. Repeat until you have picked up all the marbles. Do 1 set. Heel raises:      Single leg heel raises:  Stand with your weight evenly on both feet. Hold on to a chair or a wall for balance. Lift the foot that is not injured off the floor so all your weight is placed on your injured foot. Raise the heel of your injured foot as high as you can. Slowly lower your heel to the floor. Do 1 set of 10. Double leg heel raises:  Stand with your weight evenly on both feet. Hold on to a chair or a wall for balance. Raise both of your heels as high as you can. Slowly lower your heels to the floor. Do 1 set of 10. Heel and toe walks:      Heel walks:  Begin in a standing position. Lift your toes off the floor and walk on your heels. Keep your toes lifted as high as possible. Do 2 sets of 10. Toe walks:  Begin in a standing position. Lift your heels off the floor and walk on the balls and toes of your feet. Keep your heels lifted as high as possible. Do 2 sets of 10. How to perform a balance exercise safely:  After you can perform strengthening exercises without pain, you may do this beginning balancing exercise. Ask your healthcare provider for more advanced balance exercises. Single leg stance:  Stand with your weight evenly on both feet, or hold on to a chair or a wall. Do not lean to the side. Lift the foot that is not injured off the floor so all your weight is placed on your injured foot. Balance on your injured foot. Ask your healthcare provider how long to hold this position.            Contact your healthcare provider if: Your pain becomes worse. You have new pain. You have questions or concerns about your condition, care, or exercise program.  © 2017 [facility] [place] Information is for End User's use only and may not be sold, redistributed or otherwise used for commercial purposes. All illustrations and images included in CareNotes® are the copyrighted property of [employer], [employer]. or [employer]. The above information is an  only. It is not intended as medical advice for individual conditions or treatments. Talk to your doctor, nurse or pharmacist before following any medical regimen to see if it is safe and effective for you.

## 2023-11-02 NOTE — PROGRESS NOTES
Assessment/Plan:    Diagnoses and all orders for this visit:    Sprain of anterior talofibular ligament of left ankle, initial encounter  -     Ambulatory Referral to Physical Therapy; Future  -     Cam Boot  -     Ankle Cude ankle/Ankle Brace    Closed avulsion fracture of lateral malleolus of left fibula, initial encounter  -     Ambulatory Referral to Orthopedic Surgery  -     Ambulatory Referral to Physical Therapy; Future  -     Cam Boot  -     Ankle Cude ankle/Ankle Brace    Other orders  -     naproxen (NAPROSYN) 500 mg tablet; TAKE 1 TABLET BY MOUTH TWICE A DAY WITH MEALS FOR 14 DAYS    Continue WBAT Patient has elected to go with a lace up ankle brace and not the cam boot. I have provided a referral to formal physical therapy as well as instructions and information on ankle sprains and exercises. Recommend short course of anti-inflammatories. Xrays Left ankle reviewed      Return in about 6 weeks (around 12/14/2023). Subjective:   Patient ID: Amira Barnett is a 41 y.o. male. NP presents for Left inversion ankle injury occurring 1 month ago while playing soccer, with lateral pain and swelling, also with anterior pain. Still cannot run or jump. History of a Castro fracture on the left foot treated conservatively      Review of Systems    The following portions of the patient's chart were reviewed and updated as appropriate: Allergy:  No Known Allergies    Medications:    Current Outpatient Medications:     naproxen (NAPROSYN) 500 mg tablet, TAKE 1 TABLET BY MOUTH TWICE A DAY WITH MEALS FOR 14 DAYS, Disp: , Rfl:     Patient Active Problem List   Diagnosis    Sprain of anterior talofibular ligament of left ankle, initial encounter    Closed avulsion fracture of lateral malleolus of left fibula, initial encounter       Objective:  /86   Pulse 92   Ht 6' (1.829 m)   BMI 31.51 kg/m²     Left Ankle Exam     Tenderness   Left ankle tenderness location: anterior, ATFL.    Swelling: moderate (lateral)    Range of Motion   The patient has normal left ankle ROM. Tests   Anterior drawer: negative  Varus tilt: positive    Other   Sensation: normal  Pulse: present            Physical Exam      Neurologic Exam    Procedures    I have personally reviewed pertinent films in PACS and official results          History reviewed. No pertinent past medical history. Past Surgical History:   Procedure Laterality Date    WOUND DEBRIDEMENT Right 11/17/2018    Procedure: DEBRIDEMENT FACIAL/HEAD [facility] OUT) control of bleeding extraction site #31;  Surgeon: Kaveh Gardner DMD;  Location: [facility];  Service: Maxillofacial       Social History     Socioeconomic History    Marital status: Single     Spouse name: Not on file    Number of children: Not on file    Years of education: Not on file    Highest education level: Not on file   Occupational History    Not on file   Tobacco Use    Smoking status: Never    Smokeless tobacco: Never   Substance and Sexual Activity    Alcohol use: No    Drug use: No    Sexual activity: Not on file   Other Topics Concern    Not on file   Social History Narrative    Not on file     Social Determinants of Health     Financial Resource Strain: Not on file   Food Insecurity: Not on file   Transportation Needs: Not on file   Physical Activity: Not on file   Stress: Not on file   Social Connections: Not on file   Intimate Partner Violence: Not on file   Housing Stability: Not on file       History reviewed. No pertinent family history.

## (undated) DEVICE — MEDI-VAC NON-CONDUCTIVE SUCTION TUBING 6MM X 1.8M (6FT.) L: Brand: CARDINAL HEALTH

## (undated) DEVICE — SURGICEL 4 X 8

## (undated) DEVICE — SUCTION BOVIE ENT

## (undated) DEVICE — SUT CHROMIC 3-0 SH 27 IN G122H

## (undated) DEVICE — PENCIL ELECTROSURG E-Z CLEAN -0035H

## (undated) DEVICE — GAUZE SPONGES,16 PLY: Brand: CURITY

## (undated) DEVICE — SURGIFOAM 7 X 12 SPONGE ABS

## (undated) DEVICE — TIBURON SPLIT SHEET: Brand: CONVERTORS

## (undated) DEVICE — 10FR FRAZIER SUCTION HANDLE: Brand: CARDINAL HEALTH